# Patient Record
Sex: FEMALE | Race: OTHER | Employment: OTHER | URBAN - METROPOLITAN AREA
[De-identification: names, ages, dates, MRNs, and addresses within clinical notes are randomized per-mention and may not be internally consistent; named-entity substitution may affect disease eponyms.]

---

## 2017-03-02 NOTE — PROCEDURE NOTE: CLINICAL
PROCEDURE NOTE: Lucentis 0.5 mg OS. Diagnosis: Neovascular AMD with Active CNV. Anesthesia: Topical. Prep: Betadine Flush. Prior to injection, risks/benefits/alternatives discussed including infection, loss of vision, hemorrhage, cataract, glaucoma, retinal tears or detachment and patient wished to proceed. Topical anesthesia was induced with Alcaine. Additional anesthesia was achieved using drop(s) or injection checked above. A drop of Povidone-iodine 5% ophthalmic solution was instilled over the injection site and in the inferior fornix. Betadine prep was performed. A single use vial of intravitreal Lucentis 0.5mg/0.05ml was used and excess discarded. The needle was passed 3.0 mm posterior to the limbus in pseudophakic patients, and 3.5 mm posterior to the limbus in phakic patients. The remainder of the Lucentis 0.5mg in the single-use vial was then discarded in a medical waste disposal container. The eye was irrigated with sterile irrigating solution. Patient tolerated the procedure well. There were no complications. Post procedure instructions given. Injection Time 1205. CF vision checked. The patient was instructed to return for re-evaluation in approximately 4-12 weeks depending on his/her condition and was told to call immediately if vision decreases and/or if his/her eye becomes red, painful, and/or light sensitive. The patient was instructed to go to the emergency room or call 911 if unable to reach the doctor within an hour or two of trying or calling. The patient was instructed to use Artificial Tears q.i.d. p.r.n for comfort. Mauricio Rocha

## 2018-06-08 ENCOUNTER — NEW PATIENT COMPREHENSIVE (OUTPATIENT)
Dept: URBAN - METROPOLITAN AREA CLINIC 43 | Facility: CLINIC | Age: 83
End: 2018-06-08

## 2018-06-08 DIAGNOSIS — H35.3130: ICD-10-CM

## 2018-06-08 DIAGNOSIS — Z96.1: ICD-10-CM

## 2018-06-08 DIAGNOSIS — H00.026: ICD-10-CM

## 2018-06-08 DIAGNOSIS — H04.123: ICD-10-CM

## 2018-06-08 DIAGNOSIS — H00.023: ICD-10-CM

## 2018-06-08 PROCEDURE — 4177F TALK PT/CRGVR RE AREDS PREV: CPT

## 2018-06-08 PROCEDURE — G8427 DOCREV CUR MEDS BY ELIG CLIN: HCPCS

## 2018-06-08 PROCEDURE — 92004 COMPRE OPH EXAM NEW PT 1/>: CPT

## 2018-06-08 PROCEDURE — 2019F DILATED MACUL EXAM DONE: CPT

## 2018-06-08 PROCEDURE — G8785 BP SCRN NO PERF AT INTERVAL: HCPCS

## 2018-06-08 PROCEDURE — 92015 DETERMINE REFRACTIVE STATE: CPT

## 2018-06-08 ASSESSMENT — VISUAL ACUITY
OD_CC: 2/200
OS_CC: J6
OD_CC: >J12
OS_SC: 20/80
OS_CC: 20/60+2
OD_SC: >J12
OS_SC: J12
OD_SC: 2/200

## 2018-06-08 ASSESSMENT — TONOMETRY
OS_IOP_MMHG: 14
OD_IOP_MMHG: 14

## 2019-06-24 ENCOUNTER — ESTABLISHED COMPREHENSIVE EXAM (OUTPATIENT)
Dept: URBAN - METROPOLITAN AREA CLINIC 43 | Facility: CLINIC | Age: 84
End: 2019-06-24

## 2019-06-24 DIAGNOSIS — H35.3130: ICD-10-CM

## 2019-06-24 DIAGNOSIS — H02.883: ICD-10-CM

## 2019-06-24 DIAGNOSIS — H02.886: ICD-10-CM

## 2019-06-24 DIAGNOSIS — H04.123: ICD-10-CM

## 2019-06-24 PROCEDURE — 92014 COMPRE OPH EXAM EST PT 1/>: CPT

## 2019-06-24 PROCEDURE — 92015 DETERMINE REFRACTIVE STATE: CPT

## 2019-06-24 ASSESSMENT — VISUAL ACUITY
OD_SC: 1/200
OS_CC: J6
OS_CC: 20/200
OS_SC: 20/200
OD_CC: >J12
OD_CC: 1/200

## 2019-06-24 ASSESSMENT — TONOMETRY
OS_IOP_MMHG: 14
OD_IOP_MMHG: 13